# Patient Record
Sex: FEMALE | Race: WHITE | NOT HISPANIC OR LATINO | Employment: OTHER | URBAN - METROPOLITAN AREA
[De-identification: names, ages, dates, MRNs, and addresses within clinical notes are randomized per-mention and may not be internally consistent; named-entity substitution may affect disease eponyms.]

---

## 2023-10-26 DIAGNOSIS — I72.9 ANEURYSM (CMS-HCC): Primary | ICD-10-CM

## 2023-12-07 ENCOUNTER — TELEPHONE (OUTPATIENT)
Dept: NEUROLOGY | Facility: CLINIC | Age: 78
End: 2023-12-07
Payer: MEDICARE

## 2023-12-07 NOTE — TELEPHONE ENCOUNTER
Misha called and wanted to know the name of the neurosurgeon you wanted her to follow up with. She said it wasn't Dr. Burleson or Dr. Haile it was someone downtown.      Please advise,  Thank you!
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
## 2023-12-21 ENCOUNTER — OFFICE VISIT (OUTPATIENT)
Dept: NEUROSURGERY | Facility: HOSPITAL | Age: 78
End: 2023-12-21
Payer: MEDICARE

## 2023-12-21 VITALS
HEIGHT: 59 IN | WEIGHT: 130 LBS | HEART RATE: 69 BPM | SYSTOLIC BLOOD PRESSURE: 162 MMHG | RESPIRATION RATE: 18 BRPM | BODY MASS INDEX: 26.21 KG/M2 | DIASTOLIC BLOOD PRESSURE: 73 MMHG

## 2023-12-21 DIAGNOSIS — I72.9 ANEURYSM (CMS-HCC): ICD-10-CM

## 2023-12-21 PROCEDURE — 99202 OFFICE O/P NEW SF 15 MIN: CPT | Performed by: NEUROLOGICAL SURGERY

## 2023-12-21 PROCEDURE — 1159F MED LIST DOCD IN RCRD: CPT | Performed by: NEUROLOGICAL SURGERY

## 2023-12-21 PROCEDURE — 1036F TOBACCO NON-USER: CPT | Performed by: NEUROLOGICAL SURGERY

## 2023-12-21 PROCEDURE — 99212 OFFICE O/P EST SF 10 MIN: CPT | Performed by: NEUROLOGICAL SURGERY

## 2023-12-21 RX ORDER — METOPROLOL TARTRATE 100 MG/1
100 TABLET ORAL DAILY
COMMUNITY
End: 2024-02-28 | Stop reason: ALTCHOICE

## 2023-12-21 RX ORDER — CALC/MAG/B COMPLEX/D3/HERB 61
15 TABLET ORAL
COMMUNITY

## 2023-12-21 RX ORDER — LIFITEGRAST 50 MG/ML
1 SOLUTION/ DROPS OPHTHALMIC 2 TIMES DAILY
COMMUNITY

## 2023-12-21 ASSESSMENT — PATIENT HEALTH QUESTIONNAIRE - PHQ9
2. FEELING DOWN, DEPRESSED OR HOPELESS: NOT AT ALL
SUM OF ALL RESPONSES TO PHQ9 QUESTIONS 1 AND 2: 0
1. LITTLE INTEREST OR PLEASURE IN DOING THINGS: NOT AT ALL

## 2023-12-21 ASSESSMENT — COLUMBIA-SUICIDE SEVERITY RATING SCALE - C-SSRS
6. HAVE YOU EVER DONE ANYTHING, STARTED TO DO ANYTHING, OR PREPARED TO DO ANYTHING TO END YOUR LIFE?: NO
1. IN THE PAST MONTH, HAVE YOU WISHED YOU WERE DEAD OR WISHED YOU COULD GO TO SLEEP AND NOT WAKE UP?: NO
2. HAVE YOU ACTUALLY HAD ANY THOUGHTS OF KILLING YOURSELF?: NO

## 2024-01-04 ENCOUNTER — APPOINTMENT (OUTPATIENT)
Dept: NEUROSURGERY | Facility: HOSPITAL | Age: 79
End: 2024-01-04
Payer: MEDICARE

## 2024-01-18 PROBLEM — M65.319 SNAPPING THUMB SYNDROME: Status: ACTIVE | Noted: 2023-04-04

## 2024-01-18 PROBLEM — G47.20 DISTURBED SLEEP RHYTHM: Status: ACTIVE | Noted: 2024-01-18

## 2024-01-18 PROBLEM — R03.0 BLOOD PRESSURE ELEVATED WITHOUT HISTORY OF HTN: Status: ACTIVE | Noted: 2024-01-18

## 2024-01-18 PROBLEM — N64.4 BREAST PAIN, RIGHT: Status: ACTIVE | Noted: 2024-01-18

## 2024-01-18 PROBLEM — M71.9 DISORDER OF BURSAE OF SHOULDER REGION: Status: ACTIVE | Noted: 2022-12-01

## 2024-01-18 RX ORDER — CLOBETASOL PROPIONATE 0.46 MG/ML
SOLUTION TOPICAL
COMMUNITY
Start: 2023-01-19 | End: 2024-02-28 | Stop reason: ALTCHOICE

## 2024-01-18 RX ORDER — DESOXIMETASONE 2.5 MG/G
1 OINTMENT TOPICAL
COMMUNITY
Start: 2017-03-08 | End: 2024-02-28 | Stop reason: ALTCHOICE

## 2024-01-18 RX ORDER — VALACYCLOVIR HYDROCHLORIDE 1 G/1
TABLET, FILM COATED ORAL
COMMUNITY

## 2024-01-18 RX ORDER — ACYCLOVIR 50 MG/G
OINTMENT TOPICAL
COMMUNITY
Start: 2023-02-17

## 2024-01-18 RX ORDER — SODIUM CHLORIDE 1 G/1
1 TABLET ORAL DAILY
COMMUNITY
Start: 2023-11-04 | End: 2024-02-28 | Stop reason: ALTCHOICE

## 2024-01-18 RX ORDER — ACETAMINOPHEN 500 MG
500 TABLET ORAL
COMMUNITY

## 2024-01-18 RX ORDER — METOPROLOL SUCCINATE 100 MG/1
100 TABLET, EXTENDED RELEASE ORAL DAILY
COMMUNITY
Start: 2021-06-21 | End: 2024-06-10 | Stop reason: SDUPTHER

## 2024-01-18 RX ORDER — CHOLECALCIFEROL (VITAMIN D3) 25 MCG
1000 TABLET ORAL
COMMUNITY
Start: 2012-03-06

## 2024-01-18 RX ORDER — AMOXICILLIN 500 MG/1
500 CAPSULE ORAL
COMMUNITY
Start: 2023-12-05 | End: 2024-02-28 | Stop reason: ALTCHOICE

## 2024-01-18 RX ORDER — MELOXICAM 15 MG/1
15 TABLET ORAL DAILY
COMMUNITY
Start: 2023-04-04 | End: 2024-02-28 | Stop reason: ALTCHOICE

## 2024-01-18 RX ORDER — POLYETHYLENE GLYCOL 400 AND PROPYLENE GLYCOL 4; 3 MG/ML; MG/ML
SOLUTION/ DROPS OPHTHALMIC
COMMUNITY

## 2024-01-18 RX ORDER — ASCORBIC ACID 500 MG
1000 TABLET ORAL
COMMUNITY
Start: 2020-09-21

## 2024-01-18 RX ORDER — HYDROCORTISONE 25 MG/ML
1 LOTION TOPICAL
COMMUNITY
Start: 2017-03-08 | End: 2024-02-28 | Stop reason: ALTCHOICE

## 2024-02-28 ENCOUNTER — OFFICE VISIT (OUTPATIENT)
Dept: CARDIOLOGY | Facility: CLINIC | Age: 79
End: 2024-02-28
Payer: MEDICARE

## 2024-02-28 VITALS
SYSTOLIC BLOOD PRESSURE: 140 MMHG | WEIGHT: 142 LBS | BODY MASS INDEX: 28.63 KG/M2 | DIASTOLIC BLOOD PRESSURE: 80 MMHG | OXYGEN SATURATION: 99 % | HEART RATE: 64 BPM | HEIGHT: 59 IN

## 2024-02-28 DIAGNOSIS — I47.10 PAROXYSMAL SUPRAVENTRICULAR TACHYCARDIA (CMS-HCC): Primary | ICD-10-CM

## 2024-02-28 DIAGNOSIS — R03.0 BLOOD PRESSURE ELEVATED WITHOUT HISTORY OF HTN: ICD-10-CM

## 2024-02-28 DIAGNOSIS — I67.1 ANEURYSM OF LEFT INTERNAL CAROTID ARTERY (HHS-HCC): ICD-10-CM

## 2024-02-28 PROCEDURE — 1036F TOBACCO NON-USER: CPT | Performed by: INTERNAL MEDICINE

## 2024-02-28 PROCEDURE — 93000 ELECTROCARDIOGRAM COMPLETE: CPT | Performed by: INTERNAL MEDICINE

## 2024-02-28 PROCEDURE — 99214 OFFICE O/P EST MOD 30 MIN: CPT | Performed by: INTERNAL MEDICINE

## 2024-02-28 PROCEDURE — 1126F AMNT PAIN NOTED NONE PRSNT: CPT | Performed by: INTERNAL MEDICINE

## 2024-02-28 PROCEDURE — 3079F DIAST BP 80-89 MM HG: CPT | Performed by: INTERNAL MEDICINE

## 2024-02-28 PROCEDURE — 1160F RVW MEDS BY RX/DR IN RCRD: CPT | Performed by: INTERNAL MEDICINE

## 2024-02-28 PROCEDURE — 1159F MED LIST DOCD IN RCRD: CPT | Performed by: INTERNAL MEDICINE

## 2024-02-28 PROCEDURE — 3077F SYST BP >= 140 MM HG: CPT | Performed by: INTERNAL MEDICINE

## 2024-02-28 RX ORDER — NIFEDIPINE 30 MG/1
30 TABLET, FILM COATED, EXTENDED RELEASE ORAL
COMMUNITY

## 2024-02-28 ASSESSMENT — ENCOUNTER SYMPTOMS
DEPRESSION: 1
OCCASIONAL FEELINGS OF UNSTEADINESS: 0
LOSS OF SENSATION IN FEET: 0

## 2024-02-28 ASSESSMENT — PAIN SCALES - GENERAL: PAINLEVEL: 0-NO PAIN
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
## 2024-03-05 ENCOUNTER — OFFICE VISIT (OUTPATIENT)
Dept: NEUROLOGY | Facility: CLINIC | Age: 79
End: 2024-03-05
Payer: MEDICARE

## 2024-03-05 VITALS — BODY MASS INDEX: 29.03 KG/M2 | TEMPERATURE: 98.2 F | WEIGHT: 144 LBS | RESPIRATION RATE: 18 BRPM | HEIGHT: 59 IN

## 2024-03-05 DIAGNOSIS — R55 SYNCOPE, UNSPECIFIED SYNCOPE TYPE: Primary | ICD-10-CM

## 2024-03-05 DIAGNOSIS — R42 DIZZINESS ON STANDING: ICD-10-CM

## 2024-03-05 PROCEDURE — 1126F AMNT PAIN NOTED NONE PRSNT: CPT | Performed by: STUDENT IN AN ORGANIZED HEALTH CARE EDUCATION/TRAINING PROGRAM

## 2024-03-05 PROCEDURE — 1160F RVW MEDS BY RX/DR IN RCRD: CPT | Performed by: STUDENT IN AN ORGANIZED HEALTH CARE EDUCATION/TRAINING PROGRAM

## 2024-03-05 PROCEDURE — 99213 OFFICE O/P EST LOW 20 MIN: CPT | Performed by: STUDENT IN AN ORGANIZED HEALTH CARE EDUCATION/TRAINING PROGRAM

## 2024-03-05 PROCEDURE — 1036F TOBACCO NON-USER: CPT | Performed by: STUDENT IN AN ORGANIZED HEALTH CARE EDUCATION/TRAINING PROGRAM

## 2024-03-05 PROCEDURE — 1159F MED LIST DOCD IN RCRD: CPT | Performed by: STUDENT IN AN ORGANIZED HEALTH CARE EDUCATION/TRAINING PROGRAM

## 2024-03-05 ASSESSMENT — ENCOUNTER SYMPTOMS
LOSS OF SENSATION IN FEET: 0
DEPRESSION: 0
OCCASIONAL FEELINGS OF UNSTEADINESS: 0

## 2024-03-05 ASSESSMENT — PAIN SCALES - GENERAL: PAINLEVEL: 0-NO PAIN

## 2024-03-05 NOTE — PROGRESS NOTES
Subjective     Hansa Amaro is a 78 y.o. year old female for follow-up of unexplained LOC.    She has had no further episodes of loss of consciousness. She has new positional dizziness, feels more when she is standing on her feet. This is similar to her experience when she passed out but less severe than that episode. This started after she was stared on nifedipine. She was previously on amlodipine and recalls being dizzy on this medication.     She had an echocardiogram that was normal, holter monitor was normal.     She was found to have an incidental ICA aneurysm. She was recommend to continue strict blood pressure control and follow-up with an annual MRA.     Patient Active Problem List   Diagnosis    Abnormality of gait    Blood pressure elevated without history of HTN    Breast pain, right    Degeneration of lumbar intervertebral disc    Disorder of bursae of shoulder region    Disturbed sleep rhythm    Essential hypertension, benign    GERD (gastroesophageal reflux disease)    Glaucoma    Hematuria    Hyperlipemia    Hypokalemia    Hyponatremia    Localized osteoarthritis of hand    Low back pain    Lower urinary tract infectious disease    Lumbar sprain    Obesity    Osteopenia    Palpitations    Paroxysmal supraventricular tachycardia    Personal history of colonic polyps    Primary osteoarthritis of right knee    Snapping thumb syndrome    Sprain of hip    Aneurysm of left internal carotid artery     Objective   Neurological Exam  Mental Status  Awake, alert and oriented to person, place and time. Speech is normal.    Cranial Nerves  CN II: Visual fields full to confrontation.  CN III, IV, VI: Extraocular movements intact bilaterally.  CN V: Facial sensation is normal.  CN VII: Full and symmetric facial movement.  CN VIII: Hearing is normal.  CN IX, X: Palate elevates symmetrically  CN XI: Shoulder shrug strength is normal.  CN XII: Tongue midline without atrophy or fasciculations.    Motor   Strength is  5/5 throughout all four extremities.    Sensory  Light touch is normal in upper and lower extremities.     Coordination  Right: Finger-to-nose normal.Left: Finger-to-nose normal.    Physical Exam  Eyes:      Extraocular Movements: Extraocular movements intact.   Neurological:      Motor: Motor strength is normal.  Psychiatric:         Speech: Speech normal.       Assessment/Plan   Diagnoses and all orders for this visit:  Syncope, unspecified syncope type  Dizziness on standing    Syncope: stable, no recurrence. Unclear etiology, possible vasovagal vs orthostatic. Having positional dizziness now that she was started on nifedipine, recommended she discuss with her nephrologist and consider ACE/ARB or other alternative anti-HTN medication  Incidental aneurysm: following with neurosurgery. Plan for repeat MRA at 12 months    Follow-up PRN

## 2024-06-10 ENCOUNTER — TELEPHONE (OUTPATIENT)
Dept: CARDIOLOGY | Facility: CLINIC | Age: 79
End: 2024-06-10

## 2024-06-10 DIAGNOSIS — I47.10 PAROXYSMAL SUPRAVENTRICULAR TACHYCARDIA (CMS-HCC): ICD-10-CM

## 2024-06-10 RX ORDER — METOPROLOL SUCCINATE 100 MG/1
100 TABLET, EXTENDED RELEASE ORAL DAILY
Qty: 90 TABLET | Refills: 3 | Status: SHIPPED | OUTPATIENT
Start: 2024-06-10

## 2024-06-10 NOTE — TELEPHONE ENCOUNTER
Pt requesting refill for   metoprolol succinate XL (Toprol-XL) 100 mg 24 hr table, Take 1 tablet (100 mg) by mouth once daily, 90 day supply to [facility].    Thank you

## 2024-06-28 DIAGNOSIS — I72.9 ANEURYSM (CMS-HCC): ICD-10-CM

## 2024-06-28 DIAGNOSIS — F41.9 ANXIETY: ICD-10-CM

## 2024-06-28 RX ORDER — LORAZEPAM 0.5 MG/1
0.5 TABLET ORAL 2 TIMES DAILY
Qty: 2 TABLET | Refills: 0 | Status: SHIPPED | OUTPATIENT
Start: 2024-06-28

## 2024-07-09 DIAGNOSIS — I67.1 ANEURYSM OF LEFT INTERNAL CAROTID ARTERY (HHS-HCC): Primary | ICD-10-CM

## 2024-07-09 DIAGNOSIS — F41.9 ANXIETY: ICD-10-CM

## 2024-07-09 DIAGNOSIS — I72.9 ANEURYSM (CMS-HCC): ICD-10-CM

## 2024-07-09 RX ORDER — ALPRAZOLAM 0.5 MG/1
0.5 TABLET ORAL ONCE
Status: SHIPPED | OUTPATIENT
Start: 2024-07-09

## 2024-07-11 RX ORDER — LORAZEPAM 0.5 MG/1
0.5 TABLET ORAL 2 TIMES DAILY
Qty: 2 TABLET | Refills: 0 | Status: SHIPPED | OUTPATIENT
Start: 2024-07-11

## 2024-08-16 ENCOUNTER — APPOINTMENT (OUTPATIENT)
Dept: RADIOLOGY | Facility: HOSPITAL | Age: 79
End: 2024-08-16
Payer: MEDICARE

## 2024-08-27 ENCOUNTER — TELEPHONE (OUTPATIENT)
Dept: NEUROSURGERY | Facility: HOSPITAL | Age: 79
End: 2024-08-27
Payer: MEDICARE

## 2024-08-27 NOTE — TELEPHONE ENCOUNTER
Talked to pt after discussion with Dr. Echavarria. I told her that she can wait until her back is better and she will be able lye flat. I also told her the open MRI does not give a quality picture and she needs this. She said she has it scheduled and will do the inclosed MRI.

## 2024-09-04 NOTE — PROGRESS NOTES
Chief Complaint:   No chief complaint on file.     History Of Present Illness:    Hansa Amaro is a 79 y.o. 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     Had 1 episode of palpitations recently.  Was sitting watching TV in the evening.  Felt her heart racing.  Seemed slightly irregular.  Lasted about an hour and then slowly went back to normal.  Was no chest pain or associated lightheadedness.  Has not recurred.    Still not sleeping well.  Awakens after about 4 hours of sleep and then cannot get back to sleep.  Has never had a formal sleep study.    Has had an occasional pain in her left arm.  Occurs at rest, last several seconds, resolve spontaneously, and does not occur with activity.  Has also complained of intermittent clamminess.  No specific provoking symptoms.    Does bring her blood pressure readings in.  Blood pressure has been really well-controlled at home.    30-day ambulatory monitor September 2023: Predominantly sinus rhythm.  Rare PAC and PVC.  No evidence of sustained SVT, atrial fibrillation or flutter.  Palpitations reported which correlated with isolated PVCs.  Overall benign monitor.     MRI brain 8/12/2023: Findings consistent with chronic small vessel disease. No significant narrowing of the arteries of the head or neck. 4 mm medially oriented outpouching arising from the paraophthalmic segment of the LICA consistent with aneurysm.     Echocardiogram 8/12/2023: EF 60 to 65%. Grade 1 diastolic dysfunction. Negative bubble study.     Allergies:  Amlodipine, Benicar [olmesartan], Ceclor [cefaclor], Doxycycline, Erythromycin, Esomeprazole magnesium, Olmesartan-hydrochlorothiazide, Sulfa (sulfonamide antibiotics), and Pneumoc 20-isael conj-dip cr(pf)    Outpatient Medications:  Current Outpatient Medications   Medication Instructions  "   acetaminophen (TYLENOL EXTRA STRENGTH) 500 mg, oral    ascorbic acid (VITAMIN C) 1,000 mg, oral, Daily RT    cholecalciferol (VITAMIN D-3) 1,000 Units, oral, Daily RT    lansoprazole (PREVACID) 15 mg, oral, Daily before breakfast, Do not crush or chew. TAKES 3 TIMES WEEKLY     lifitegrast (Xiidra) 5 % dropperette 1 drop, ophthalmic (eye), 2 times daily    LORazepam (ATIVAN) 0.5 mg, oral, 2 times daily, Please take1 tab 1 hour prior to MRI scan. If needed take second tab when arriving for MRI scan.    metoprolol succinate XL (TOPROL-XL) 100 mg, oral, Daily    NIFEdipine ER (ADALAT CC) 30 mg, oral, Daily before breakfast, Do not crush, chew, or split.    propylene glycol (SYSTANE BALANCE OPHT) 1 drop    valACYclovir (Valtrex) 1 gram tablet        Last Recorded Vitals:  Visit Vitals  /78 (BP Location: Left arm, Patient Position: Sitting)   Pulse 66   Ht 1.499 m (4' 11\")   Wt 78 kg (172 lb)   LMP  (LMP Unknown)   SpO2 99%   BMI 34.74 kg/m²   OB Status Postmenopausal   Smoking Status Never   BSA 1.8 m²      LASTWT(3):   Wt Readings from Last 3 Encounters:   09/05/24 78 kg (172 lb)   03/05/24 65.3 kg (144 lb)   02/28/24 64.4 kg (142 lb)       Physical Exam:  In general: alert and in no acute distress.   HEENT: Carotid upstrokes normal with no bruits. JVP is normal.   Pulmonary: Clear to auscultation bilaterally.  Cardiovascular: S1,S2, regular. No appreciable murmurs, rubs or gallops.   Lower extremities: Warm. 2+ distal pulses. No edema.     Last Labs:  CBC -  Recent Labs     08/13/23  0655 08/12/23  0954 08/11/23  2140   WBC CANCELED 9.4 10.1   HGB CANCELED 11.6* 12.6   HCT CANCELED 34.1* 38.0   PLT CANCELED 255 280   MCV CANCELED 88 91       CMP -  Recent Labs     08/13/23  0655 08/12/23  0954 08/11/23  2140   NA CANCELED 127* 130*   K CANCELED 3.5 3.3*   CL CANCELED 94* 96*   CO2 CANCELED 25 23   ANIONGAP CANCELED 12 14   BUN CANCELED 10 13   CREATININE CANCELED 0.51 0.80     No results for input(s): " "\"ALBUMIN\", \"ALKPHOS\", \"ALT\", \"AST\", \"BILITOT\" in the last 64677 hours.    No lab exists for component: \"CA\"    LIPID PANEL -   Recent Labs     08/13/23 2118   CHOL CANCELED   LDLF CANCELED   HDL CANCELED   TRIG CANCELED       Recent Labs     08/13/23 2118 08/18/21  1347   HGBA1C CANCELED 5.1           Assessment/Plan   1) paroxysmal SVT: 1 episode lasting about an hour.  Has not recurred.  I am concerned that she does have disturbed sleep which I told her could be triggering episodes of SVT.  Would ask her to be seen by the sleep medicine team first before pursuing the palpitations any further.    In the meantime if she is having more palpitations we can always consider ambulatory monitoring but once again I would like to hold off until there is further evaluation for the sleep.     2) hypertension: Blood pressure very well-controlled at home.       3) syncope: No recurrence.  Continue to observe.     4) left ICA aneurysm: Will continue to follow with neurosurgery     5) disturbed sleep and daytime somnolence: Will ask her to be seen by the sleep medicine team.    6) follow-up: 6 months or sooner if needed.      Thai Barba MD  "

## 2024-09-05 ENCOUNTER — APPOINTMENT (OUTPATIENT)
Dept: CARDIOLOGY | Facility: CLINIC | Age: 79
End: 2024-09-05
Payer: MEDICARE

## 2024-09-05 VITALS
OXYGEN SATURATION: 99 % | BODY MASS INDEX: 34.68 KG/M2 | HEIGHT: 59 IN | SYSTOLIC BLOOD PRESSURE: 140 MMHG | HEART RATE: 66 BPM | DIASTOLIC BLOOD PRESSURE: 78 MMHG | WEIGHT: 172 LBS

## 2024-09-05 DIAGNOSIS — I10 ESSENTIAL HYPERTENSION, BENIGN: ICD-10-CM

## 2024-09-05 DIAGNOSIS — I47.10 PAROXYSMAL SUPRAVENTRICULAR TACHYCARDIA (CMS-HCC): Primary | ICD-10-CM

## 2024-09-05 DIAGNOSIS — I67.1 ANEURYSM OF LEFT INTERNAL CAROTID ARTERY (HHS-HCC): ICD-10-CM

## 2024-09-05 DIAGNOSIS — R40.0 DAYTIME SOMNOLENCE: ICD-10-CM

## 2024-09-05 PROCEDURE — 1036F TOBACCO NON-USER: CPT | Performed by: INTERNAL MEDICINE

## 2024-09-05 PROCEDURE — G2211 COMPLEX E/M VISIT ADD ON: HCPCS | Performed by: INTERNAL MEDICINE

## 2024-09-05 PROCEDURE — 3077F SYST BP >= 140 MM HG: CPT | Performed by: INTERNAL MEDICINE

## 2024-09-05 PROCEDURE — 1159F MED LIST DOCD IN RCRD: CPT | Performed by: INTERNAL MEDICINE

## 2024-09-05 PROCEDURE — 3078F DIAST BP <80 MM HG: CPT | Performed by: INTERNAL MEDICINE

## 2024-09-05 PROCEDURE — 1126F AMNT PAIN NOTED NONE PRSNT: CPT | Performed by: INTERNAL MEDICINE

## 2024-09-05 PROCEDURE — 99214 OFFICE O/P EST MOD 30 MIN: CPT | Performed by: INTERNAL MEDICINE

## 2024-09-05 PROCEDURE — 1160F RVW MEDS BY RX/DR IN RCRD: CPT | Performed by: INTERNAL MEDICINE

## 2024-09-05 ASSESSMENT — PAIN SCALES - GENERAL: PAINLEVEL: 0-NO PAIN

## 2024-09-05 ASSESSMENT — ENCOUNTER SYMPTOMS
OCCASIONAL FEELINGS OF UNSTEADINESS: 1
LOSS OF SENSATION IN FEET: 0
DEPRESSION: 1

## 2024-09-06 ENCOUNTER — HOSPITAL ENCOUNTER (OUTPATIENT)
Dept: RADIOLOGY | Facility: HOSPITAL | Age: 79
Discharge: HOME | End: 2024-09-06
Payer: MEDICARE

## 2024-09-06 DIAGNOSIS — I72.9 ANEURYSM OF UNSPECIFIED SITE (CMS-HCC): ICD-10-CM

## 2024-09-06 PROCEDURE — 70544 MR ANGIOGRAPHY HEAD W/O DYE: CPT

## 2025-02-03 ENCOUNTER — TELEPHONE (OUTPATIENT)
Dept: CARDIOLOGY | Facility: CLINIC | Age: 80
End: 2025-02-03

## 2025-02-03 DIAGNOSIS — I47.10 PAROXYSMAL SUPRAVENTRICULAR TACHYCARDIA (CMS-HCC): Primary | ICD-10-CM

## 2025-02-03 DIAGNOSIS — I49.8 OTHER SPECIFIED CARDIAC ARRHYTHMIAS: ICD-10-CM

## 2025-02-03 NOTE — TELEPHONE ENCOUNTER
Dr. Barba spoke with the Dr. Smith today;    Epic chat from Dr. Babra to me;    She is again asked the patient to give us a call. I told her that I would order a 7-day ambulatory monitor. The [employer] monitor was ordered. The patient is still at their office getting some blood work. Can you call her either later today or tomorrow and help her schedule the 7-day [employer] monitor? Thank you

## 2025-02-05 NOTE — PROGRESS NOTES
Chief Complaint:   No chief complaint on file.     History Of Present Illness:    Hansa Amaro is a 79 y.o. female with a history of syncope, paroxysmal SVT, disturbed sleep, left internal carotid artery aneurysm, and elevated blood pressure without a history of hypertension here for follow-up.    Was at her PCP office when she was noted to have an elevated heart rate.  ECG demonstrated SVT likely atrial fibrillation.  Her symptoms resolved and she has not had palpitations since that time.    Patient states that about once a month she will have palpitations that were similar to the palpitation she had at that time.  The last several hours and then go away.     Has a blood pressure cuff at home but has not checked it recently.    MR head without contrast 9/6/2024: Unchanged 4 mm left ICA carotid cave aneurysm.    30-day ambulatory monitor September 2023: Predominantly sinus rhythm.  Rare PAC and PVC.  No evidence of sustained SVT, atrial fibrillation or flutter.  Palpitations reported which correlated with isolated PVCs.  Overall benign monitor.     MRI brain 8/12/2023: Findings consistent with chronic small vessel disease. No significant narrowing of the arteries of the head or neck. 4 mm medially oriented outpouching arising from the paraophthalmic segment of the LICA consistent with aneurysm.     Echocardiogram 8/12/2023: EF 60 to 65%. Grade 1 diastolic dysfunction. Negative bubble study.     Allergies:  Amlodipine, Benicar [olmesartan], Ceclor [cefaclor], Doxycycline, Erythromycin, Esomeprazole magnesium, Olmesartan-hydrochlorothiazide, Sulfa (sulfonamide antibiotics), and Pneumoc 20-isael conj-dip cr(pf)    Outpatient Medications:  Current Outpatient Medications   Medication Instructions    acetaminophen (TYLENOL EXTRA STRENGTH) 500 mg, 2 times daily    ascorbic acid (VITAMIN C) 1,000 mg, Daily RT    cholecalciferol (VITAMIN D-3) 1,000 Units, Daily RT    lansoprazole (PREVACID) 15 mg, Daily before breakfast     "lifitegrast (Xiidra) 5 % dropperette 1 drop, 2 times daily    metoprolol succinate XL (TOPROL-XL) 100 mg, oral, Daily    NIFEdipine ER (ADALAT CC) 30 mg, Daily before breakfast    propylene glycol (SYSTANE BALANCE OPHT) 1 drop       Last Recorded Vitals:  Visit Vitals  /72 (BP Location: Right arm, Patient Position: Sitting, BP Cuff Size: Adult)   Pulse 71   Ht 1.499 m (4' 11\")   Wt 84.4 kg (186 lb)   LMP  (LMP Unknown)   SpO2 99%   BMI 37.57 kg/m²   OB Status Postmenopausal   Smoking Status Never   BSA 1.87 m²      LASTWT(3):   Wt Readings from Last 3 Encounters:   02/06/25 84.4 kg (186 lb)   09/05/24 78 kg (172 lb)   03/05/24 65.3 kg (144 lb)       Physical Exam:  In general: alert and in no acute distress.   HEENT: Carotid upstrokes normal with no bruits. JVP is normal.   Pulmonary: Clear to auscultation bilaterally.  Cardiovascular: S1,S2, regular. No appreciable murmurs, rubs or gallops.   Lower extremities: Warm. 2+ distal pulses. No edema.     Last Labs:  CBC -  Recent Labs     08/13/23  0655 08/12/23  0954 08/11/23  2140   WBC CANCELED 9.4 10.1   HGB CANCELED 11.6* 12.6   HCT CANCELED 34.1* 38.0   PLT CANCELED 255 280   MCV CANCELED 88 91       CMP -  Recent Labs     08/13/23  0655 08/12/23  0954 08/11/23  2140   NA CANCELED 127* 130*   K CANCELED 3.5 3.3*   CL CANCELED 94* 96*   CO2 CANCELED 25 23   ANIONGAP CANCELED 12 14   BUN CANCELED 10 13   CREATININE CANCELED 0.51 0.80     No results for input(s): \"ALBUMIN\", \"ALKPHOS\", \"ALT\", \"AST\", \"BILITOT\" in the last 32017 hours.    No lab exists for component: \"CA\"    LIPID PANEL -   Recent Labs     08/13/23  2118   CHOL CANCELED   LDLF CANCELED   HDL CANCELED   TRIG CANCELED       Recent Labs     08/13/23  2118 08/18/21  1347   HGBA1C CANCELED 5.1           Assessment/Plan   1) paroxysmal SVT: Possible atrial fibrillation.  Has a monitor ordered.  Would like to get an idea if she is having episodes of atrial fibrillation.  [id number] score would be 3 " right now for age and gender.  Asked her also to check her blood pressure as this would increase her [id number] score.  Hold off on anticoagulation for now.    7-day monitor ordered.  She is also looking at getting a TrackIF system.     2) hypertension: Blood pressure very well-controlled at home historically.  Has not been checking recently.  Asked her to check and give us a call.     3) syncope: No recurrence.  Continue to observe.     4) left ICA aneurysm: Most recent MR demonstrates no change in the carotid cave aneurysm. She will continue to follow with neurosurgery     5) disturbed sleep and daytime somnolence: Will ask her to be seen by the sleep medicine team.    6) Dyslipidemia: Based on current guidelines there is no indication to initiate statin therapy.    7) follow-up: 3 months or sooner if needed.      Thai Barba MD

## 2025-02-06 ENCOUNTER — OFFICE VISIT (OUTPATIENT)
Dept: CARDIOLOGY | Facility: CLINIC | Age: 80
End: 2025-02-06
Payer: MEDICARE

## 2025-02-06 ENCOUNTER — HOSPITAL ENCOUNTER (OUTPATIENT)
Dept: CARDIOLOGY | Facility: HOSPITAL | Age: 80
Discharge: HOME | End: 2025-02-06
Payer: MEDICARE

## 2025-02-06 VITALS
BODY MASS INDEX: 37.5 KG/M2 | HEIGHT: 59 IN | WEIGHT: 186 LBS | DIASTOLIC BLOOD PRESSURE: 72 MMHG | OXYGEN SATURATION: 99 % | HEART RATE: 71 BPM | SYSTOLIC BLOOD PRESSURE: 144 MMHG

## 2025-02-06 DIAGNOSIS — I47.10 PAROXYSMAL SUPRAVENTRICULAR TACHYCARDIA (CMS-HCC): ICD-10-CM

## 2025-02-06 DIAGNOSIS — I10 ESSENTIAL HYPERTENSION, BENIGN: ICD-10-CM

## 2025-02-06 DIAGNOSIS — I67.1 ANEURYSM OF LEFT INTERNAL CAROTID ARTERY (HHS-HCC): ICD-10-CM

## 2025-02-06 DIAGNOSIS — I47.10 PAROXYSMAL SUPRAVENTRICULAR TACHYCARDIA (CMS-HCC): Primary | ICD-10-CM

## 2025-02-06 DIAGNOSIS — I49.8 OTHER SPECIFIED CARDIAC ARRHYTHMIAS: ICD-10-CM

## 2025-02-06 LAB — BODY SURFACE AREA: 1.87 M2

## 2025-02-06 PROCEDURE — 1126F AMNT PAIN NOTED NONE PRSNT: CPT | Performed by: INTERNAL MEDICINE

## 2025-02-06 PROCEDURE — 1159F MED LIST DOCD IN RCRD: CPT | Performed by: INTERNAL MEDICINE

## 2025-02-06 PROCEDURE — 93242 EXT ECG>48HR<7D RECORDING: CPT

## 2025-02-06 PROCEDURE — 1036F TOBACCO NON-USER: CPT | Performed by: INTERNAL MEDICINE

## 2025-02-06 PROCEDURE — 99214 OFFICE O/P EST MOD 30 MIN: CPT | Performed by: INTERNAL MEDICINE

## 2025-02-06 PROCEDURE — 1160F RVW MEDS BY RX/DR IN RCRD: CPT | Performed by: INTERNAL MEDICINE

## 2025-02-06 PROCEDURE — 3078F DIAST BP <80 MM HG: CPT | Performed by: INTERNAL MEDICINE

## 2025-02-06 PROCEDURE — 3077F SYST BP >= 140 MM HG: CPT | Performed by: INTERNAL MEDICINE

## 2025-02-06 ASSESSMENT — PAIN SCALES - GENERAL: PAINLEVEL_OUTOF10: 0-NO PAIN

## 2025-02-21 LAB — BODY SURFACE AREA: 1.87 M2

## 2025-03-05 ENCOUNTER — APPOINTMENT (OUTPATIENT)
Dept: CARDIOLOGY | Facility: CLINIC | Age: 80
End: 2025-03-05
Payer: MEDICARE

## 2025-05-16 ENCOUNTER — OFFICE VISIT (OUTPATIENT)
Dept: CARDIOLOGY | Facility: CLINIC | Age: 80
End: 2025-05-16
Payer: MEDICARE

## 2025-05-16 VITALS
SYSTOLIC BLOOD PRESSURE: 132 MMHG | HEART RATE: 68 BPM | BODY MASS INDEX: 33.57 KG/M2 | HEIGHT: 60 IN | DIASTOLIC BLOOD PRESSURE: 52 MMHG | OXYGEN SATURATION: 97 % | WEIGHT: 171 LBS

## 2025-05-16 DIAGNOSIS — I67.1 ANEURYSM OF LEFT INTERNAL CAROTID ARTERY (HHS-HCC): ICD-10-CM

## 2025-05-16 DIAGNOSIS — I10 ESSENTIAL HYPERTENSION, BENIGN: ICD-10-CM

## 2025-05-16 DIAGNOSIS — R03.0 BLOOD PRESSURE ELEVATED WITHOUT HISTORY OF HTN: ICD-10-CM

## 2025-05-16 DIAGNOSIS — I47.10 PAROXYSMAL SUPRAVENTRICULAR TACHYCARDIA: Primary | ICD-10-CM

## 2025-05-16 PROCEDURE — 1036F TOBACCO NON-USER: CPT | Performed by: INTERNAL MEDICINE

## 2025-05-16 PROCEDURE — 99214 OFFICE O/P EST MOD 30 MIN: CPT | Performed by: INTERNAL MEDICINE

## 2025-05-16 PROCEDURE — 3075F SYST BP GE 130 - 139MM HG: CPT | Performed by: INTERNAL MEDICINE

## 2025-05-16 PROCEDURE — 1160F RVW MEDS BY RX/DR IN RCRD: CPT | Performed by: INTERNAL MEDICINE

## 2025-05-16 PROCEDURE — 1159F MED LIST DOCD IN RCRD: CPT | Performed by: INTERNAL MEDICINE

## 2025-05-16 PROCEDURE — 3078F DIAST BP <80 MM HG: CPT | Performed by: INTERNAL MEDICINE

## 2025-05-16 NOTE — PROGRESS NOTES
Chief Complaint:   No chief complaint on file.     History Of Present Illness:    Hansa Amaro is a 80 y.o. female with a history of syncope, paroxysmal SVT, disturbed sleep, left internal carotid artery aneurysm, and elevated blood pressure without a history of hypertension here for follow-up.    Overall states she is doing well.  Has not had any significant palpitations.    Did get [employer] mobile, hasn't set it up yet (needs to download caitlin).     Said that her ophthalmologist noted some pallor in the optic disks.  Had suggested to her that this may be secondary to a circulatory problem.    Was at her PCP office when she was noted to have an elevated heart rate.  ECG demonstrated SVT possibly atrial fibrillation.      7-day ambulatory monitor February 2025: Predominantly sinus rhythm with rare PACs and PVCs.  11 episodes of SVT with the longest episode 16 beats and the fastest 148 bpm.  4 triggered events unrelated to arrhythmia.    MR head without contrast 9/6/2024: Unchanged 4 mm left ICA carotid cave aneurysm.    30-day ambulatory monitor September 2023: Predominantly sinus rhythm.  Rare PAC and PVC.  No evidence of sustained SVT, atrial fibrillation or flutter.  Palpitations reported which correlated with isolated PVCs.  Overall benign monitor.     MRI brain 8/12/2023: Findings consistent with chronic small vessel disease. No significant narrowing of the arteries of the head or neck. 4 mm medially oriented outpouching arising from the paraophthalmic segment of the LICA consistent with aneurysm.     Echocardiogram 8/12/2023: EF 60 to 65%. Grade 1 diastolic dysfunction. Negative bubble study.     Allergies:  Amlodipine, Benicar [olmesartan], Ceclor [cefaclor], Doxycycline, Erythromycin, Esomeprazole magnesium, Olmesartan-hydrochlorothiazide, Sulfa (sulfonamide antibiotics), and Pneumoc 20-isael conj-dip cr(pf)    Outpatient Medications:  Current Outpatient Medications   Medication Instructions    acetaminophen  "(TYLENOL EXTRA STRENGTH) 500 mg, 2 times daily    ascorbic acid (VITAMIN C) 1,000 mg, Daily RT    cholecalciferol (VITAMIN D-3) 1,000 Units, Daily RT    lansoprazole (PREVACID) 15 mg, Daily before breakfast    lifitegrast (Xiidra) 5 % dropperette 1 drop, 2 times daily    metoprolol succinate XL (TOPROL-XL) 100 mg, oral, Daily    NIFEdipine ER (ADALAT CC) 30 mg, Daily before breakfast    propylene glycol (SYSTANE BALANCE OPHT) 1 drop       Last Recorded Vitals:  Visit Vitals  /52 (BP Location: Left arm, Patient Position: Sitting)   Pulse 68   Ht 1.524 m (5')   Wt 77.6 kg (171 lb)   LMP  (LMP Unknown)   SpO2 97%   BMI 33.40 kg/m²   OB Status Postmenopausal   Smoking Status Never   BSA 1.81 m²      LASTWT(3):   Wt Readings from Last 3 Encounters:   05/16/25 77.6 kg (171 lb)   02/06/25 84.4 kg (186 lb)   09/05/24 78 kg (172 lb)       Physical Exam:  In general: alert and in no acute distress.   HEENT: Carotid upstrokes normal with no bruits. JVP is normal.   Pulmonary: Clear to auscultation bilaterally.  Cardiovascular: S1,S2, regular. No appreciable murmurs, rubs or gallops.   Lower extremities: Warm. 2+ distal pulses. No edema.     Last Labs:  CBC -  Recent Labs     08/13/23  0655 08/12/23  0954 08/11/23  2140   WBC CANCELED 9.4 10.1   HGB CANCELED 11.6* 12.6   HCT CANCELED 34.1* 38.0   PLT CANCELED 255 280   MCV CANCELED 88 91       CMP -  Recent Labs     08/13/23  0655 08/12/23  0954 08/11/23  2140   NA CANCELED 127* 130*   K CANCELED 3.5 3.3*   CL CANCELED 94* 96*   CO2 CANCELED 25 23   ANIONGAP CANCELED 12 14   BUN CANCELED 10 13   CREATININE CANCELED 0.51 0.80     No results for input(s): \"ALBUMIN\", \"ALKPHOS\", \"ALT\", \"AST\", \"BILITOT\" in the last 30048 hours.    No lab exists for component: \"CA\"    LIPID PANEL -   Recent Labs     08/13/23  2118   CHOL CANCELED   LDLF CANCELED   HDL CANCELED   TRIG CANCELED     2/3/25: , trig 121, HDL 72, total 212  2/9/24: , trig 55, HDL 76, total " 204      Recent Labs     08/13/23  2118 08/18/21  1347   HGBA1C CANCELED 5.1           Assessment/Plan   1) paroxysmal SVT: EKG in February demonstrated SVT but I am not convinced that it was atrial fibrillation.  No evidence of atrial fibrillation on ambulatory monitoring.    Did explain that she would have an elevated [id number] score if she were to be found to have atrial fibrillation.  With a score of 4 she would have a recommendation to be on long-term oral anticoagulation.  If we were to detect atrial fibrillation in the future I would speak with her neurology/neurosurgical team to make sure that she would be a candidate for direct oral anticoagulant.  If she would be felt to be too high risk for DOAC then I would suggest the patient be seen by the structural heart team for left atrial appendage occlusion device.    Once again, she has not had definitive atrial fibrillation.  We will continue to observe.    2) hypertension: Blood pressure control.  No changes needed.     3) syncope: No recurrence.  Continue to observe.     4) left ICA aneurysm: Most recent MR demonstrates no change in the carotid cave aneurysm. She will continue to follow with neurosurgery    Of note there is no other intracranial arterial abnormality noted.  If her ophthalmologist notes continued change in the pallor of the optic discs then I would asked the patient to reach out to her neurosurgical team to further evaluate the MRI/MRA.     5) Dyslipidemia: Based on current guidelines there is no indication to initiate statin therapy.    6) follow-up: 6 months or sooner if needed.      ROBERTO MEYERS

## 2025-06-02 DIAGNOSIS — I47.10 PAROXYSMAL SUPRAVENTRICULAR TACHYCARDIA: ICD-10-CM

## 2025-06-02 RX ORDER — METOPROLOL SUCCINATE 100 MG/1
100 TABLET, EXTENDED RELEASE ORAL DAILY
Qty: 90 TABLET | Refills: 3 | Status: SHIPPED | OUTPATIENT
Start: 2025-06-02